# Patient Record
Sex: FEMALE | Race: BLACK OR AFRICAN AMERICAN | NOT HISPANIC OR LATINO | URBAN - METROPOLITAN AREA
[De-identification: names, ages, dates, MRNs, and addresses within clinical notes are randomized per-mention and may not be internally consistent; named-entity substitution may affect disease eponyms.]

---

## 2021-11-03 ENCOUNTER — OUTPATIENT (OUTPATIENT)
Dept: OUTPATIENT SERVICES | Facility: HOSPITAL | Age: 41
LOS: 1 days | End: 2021-11-03
Payer: MEDICAID

## 2021-11-03 ENCOUNTER — TRANSCRIPTION ENCOUNTER (OUTPATIENT)
Age: 41
End: 2021-11-03

## 2021-11-03 VITALS
TEMPERATURE: 98 F | SYSTOLIC BLOOD PRESSURE: 132 MMHG | WEIGHT: 190.04 LBS | DIASTOLIC BLOOD PRESSURE: 83 MMHG | HEART RATE: 81 BPM | OXYGEN SATURATION: 99 % | HEIGHT: 66 IN | RESPIRATION RATE: 16 BRPM

## 2021-11-03 DIAGNOSIS — N93.9 ABNORMAL UTERINE AND VAGINAL BLEEDING, UNSPECIFIED: ICD-10-CM

## 2021-11-03 DIAGNOSIS — M32.9 SYSTEMIC LUPUS ERYTHEMATOSUS, UNSPECIFIED: ICD-10-CM

## 2021-11-03 DIAGNOSIS — Z29.9 ENCOUNTER FOR PROPHYLACTIC MEASURES, UNSPECIFIED: ICD-10-CM

## 2021-11-03 DIAGNOSIS — Z01.818 ENCOUNTER FOR OTHER PREPROCEDURAL EXAMINATION: ICD-10-CM

## 2021-11-03 DIAGNOSIS — G47.33 OBSTRUCTIVE SLEEP APNEA (ADULT) (PEDIATRIC): ICD-10-CM

## 2021-11-03 DIAGNOSIS — Z98.51 TUBAL LIGATION STATUS: Chronic | ICD-10-CM

## 2021-11-03 LAB — BLD GP AB SCN SERPL QL: SIGNIFICANT CHANGE UP

## 2021-11-03 PROCEDURE — G0463: CPT

## 2021-11-03 PROCEDURE — 93005 ELECTROCARDIOGRAM TRACING: CPT

## 2021-11-03 PROCEDURE — 93010 ELECTROCARDIOGRAM REPORT: CPT

## 2021-11-03 NOTE — H&P PST ADULT - PROBLEM SELECTOR PLAN 4
On no medications  Maintain follow up appointment On no medications  Maintain follow up appointment with rheumatologist

## 2021-11-03 NOTE — H&P PST ADULT - NSICDXPASTMEDICALHX_GEN_ALL_CORE_FT
PAST MEDICAL HISTORY:  Fibroid uterus     Lupus , last flare up 6/21    Sciatica     Umbilical hernia

## 2021-11-03 NOTE — H&P PST ADULT - ATTENDING COMMENTS
42 yo with AUB, pelvic pain and fibroids for BEE, Bilateral salpingectomy, USS, cystoscopy  r/b/a extensively discussed, risks of pain, bleeding, infection, damage to adjacent organs, need for repair, additional surgeries, prolapse    pt voiced understanding and agreed with the plan    [email or web address]

## 2021-11-03 NOTE — H&P PST ADULT - PROBLEM SELECTOR PLAN 2
Instruction regarding chlorhexidine soap use x 2 days before surgery is given.  Patient verbalized understanding.   Literature also given

## 2021-11-03 NOTE — H&P PST ADULT - NSANTHOSAYNRD_GEN_A_CORE
No. HARJIT screening performed.  STOP BANG Legend: 0-2 = LOW Risk; 3-4 = INTERMEDIATE Risk; 5-8 = HIGH Risk

## 2021-11-03 NOTE — H&P PST ADULT - HISTORY OF PRESENT ILLNESS
40 yo female with history of Lupus, reports the above.   She is scheduled for : Cystoscopy  Open Hysterectomy, on 11/11/21

## 2021-11-04 LAB
A1C WITH ESTIMATED AVERAGE GLUCOSE RESULT: 5.7 % — HIGH (ref 4–5.6)
ESTIMATED AVERAGE GLUCOSE: 117 MG/DL — HIGH (ref 68–114)

## 2021-11-06 DIAGNOSIS — Z01.818 ENCOUNTER FOR OTHER PREPROCEDURAL EXAMINATION: ICD-10-CM

## 2021-11-06 PROBLEM — Z00.00 ENCOUNTER FOR PREVENTIVE HEALTH EXAMINATION: Status: ACTIVE | Noted: 2021-11-06

## 2021-11-08 ENCOUNTER — APPOINTMENT (OUTPATIENT)
Dept: DISASTER EMERGENCY | Facility: CLINIC | Age: 41
End: 2021-11-08

## 2021-11-09 LAB — SARS-COV-2 N GENE NPH QL NAA+PROBE: NOT DETECTED

## 2021-11-10 ENCOUNTER — TRANSCRIPTION ENCOUNTER (OUTPATIENT)
Age: 41
End: 2021-11-10

## 2021-11-11 ENCOUNTER — RESULT REVIEW (OUTPATIENT)
Age: 41
End: 2021-11-11

## 2021-11-11 ENCOUNTER — INPATIENT (INPATIENT)
Facility: HOSPITAL | Age: 41
LOS: 2 days | Discharge: HOME | DRG: 743 | End: 2021-11-14
Attending: OBSTETRICS & GYNECOLOGY | Admitting: OBSTETRICS & GYNECOLOGY
Payer: MEDICAID

## 2021-11-11 VITALS
HEIGHT: 66 IN | WEIGHT: 190.04 LBS | DIASTOLIC BLOOD PRESSURE: 70 MMHG | SYSTOLIC BLOOD PRESSURE: 122 MMHG | RESPIRATION RATE: 18 BRPM | HEART RATE: 89 BPM | TEMPERATURE: 100 F | OXYGEN SATURATION: 100 %

## 2021-11-11 DIAGNOSIS — N93.9 ABNORMAL UTERINE AND VAGINAL BLEEDING, UNSPECIFIED: ICD-10-CM

## 2021-11-11 DIAGNOSIS — Z98.51 TUBAL LIGATION STATUS: Chronic | ICD-10-CM

## 2021-11-11 LAB
BLD GP AB SCN SERPL QL: SIGNIFICANT CHANGE UP
GLUCOSE BLDC GLUCOMTR-MCNC: 103 MG/DL — HIGH (ref 70–99)
GLUCOSE BLDC GLUCOMTR-MCNC: 110 MG/DL — HIGH (ref 70–99)
HCG UR QL: NEGATIVE — SIGNIFICANT CHANGE UP

## 2021-11-11 PROCEDURE — 88307 TISSUE EXAM BY PATHOLOGIST: CPT | Mod: 26

## 2021-11-11 RX ORDER — ENOXAPARIN SODIUM 100 MG/ML
40 INJECTION SUBCUTANEOUS EVERY 24 HOURS
Refills: 0 | Status: DISCONTINUED | OUTPATIENT
Start: 2021-11-12 | End: 2021-11-14

## 2021-11-11 RX ORDER — NALOXONE HYDROCHLORIDE 4 MG/.1ML
0.1 SPRAY NASAL
Refills: 0 | Status: DISCONTINUED | OUTPATIENT
Start: 2021-11-11 | End: 2021-11-14

## 2021-11-11 RX ORDER — SIMETHICONE 80 MG/1
80 TABLET, CHEWABLE ORAL EVERY 6 HOURS
Refills: 0 | Status: DISCONTINUED | OUTPATIENT
Start: 2021-11-11 | End: 2021-11-14

## 2021-11-11 RX ORDER — SODIUM CHLORIDE 9 MG/ML
1000 INJECTION, SOLUTION INTRAVENOUS
Refills: 0 | Status: DISCONTINUED | OUTPATIENT
Start: 2021-11-11 | End: 2021-11-11

## 2021-11-11 RX ORDER — ACETAMINOPHEN 500 MG
1000 TABLET ORAL EVERY 6 HOURS
Refills: 0 | Status: DISCONTINUED | OUTPATIENT
Start: 2021-11-11 | End: 2021-11-14

## 2021-11-11 RX ORDER — IBUPROFEN 200 MG
600 TABLET ORAL EVERY 6 HOURS
Refills: 0 | Status: DISCONTINUED | OUTPATIENT
Start: 2021-11-11 | End: 2021-11-11

## 2021-11-11 RX ORDER — HYDROMORPHONE HYDROCHLORIDE 2 MG/ML
30 INJECTION INTRAMUSCULAR; INTRAVENOUS; SUBCUTANEOUS
Refills: 0 | Status: DISCONTINUED | OUTPATIENT
Start: 2021-11-11 | End: 2021-11-11

## 2021-11-11 RX ORDER — HYDROMORPHONE HYDROCHLORIDE 2 MG/ML
0.5 INJECTION INTRAMUSCULAR; INTRAVENOUS; SUBCUTANEOUS
Refills: 0 | Status: DISCONTINUED | OUTPATIENT
Start: 2021-11-11 | End: 2021-11-11

## 2021-11-11 RX ORDER — ONDANSETRON 8 MG/1
8 TABLET, FILM COATED ORAL EVERY 8 HOURS
Refills: 0 | Status: DISCONTINUED | OUTPATIENT
Start: 2021-11-11 | End: 2021-11-14

## 2021-11-11 RX ORDER — OXYCODONE HYDROCHLORIDE 5 MG/1
10 TABLET ORAL EVERY 4 HOURS
Refills: 0 | Status: DISCONTINUED | OUTPATIENT
Start: 2021-11-11 | End: 2021-11-11

## 2021-11-11 RX ORDER — PANTOPRAZOLE SODIUM 20 MG/1
20 TABLET, DELAYED RELEASE ORAL DAILY
Refills: 0 | Status: DISCONTINUED | OUTPATIENT
Start: 2021-11-11 | End: 2021-11-12

## 2021-11-11 RX ORDER — CHLORHEXIDINE GLUCONATE 213 G/1000ML
1 SOLUTION TOPICAL ONCE
Refills: 0 | Status: DISCONTINUED | OUTPATIENT
Start: 2021-11-11 | End: 2021-11-11

## 2021-11-11 RX ORDER — ACETAMINOPHEN 500 MG
1000 TABLET ORAL EVERY 6 HOURS
Refills: 0 | Status: DISCONTINUED | OUTPATIENT
Start: 2021-11-11 | End: 2021-11-11

## 2021-11-11 RX ORDER — SODIUM CHLORIDE 9 MG/ML
3 INJECTION INTRAMUSCULAR; INTRAVENOUS; SUBCUTANEOUS EVERY 8 HOURS
Refills: 0 | Status: DISCONTINUED | OUTPATIENT
Start: 2021-11-11 | End: 2021-11-11

## 2021-11-11 RX ORDER — HYDROMORPHONE HYDROCHLORIDE 2 MG/ML
4 INJECTION INTRAMUSCULAR; INTRAVENOUS; SUBCUTANEOUS ONCE
Refills: 0 | Status: DISCONTINUED | OUTPATIENT
Start: 2021-11-11 | End: 2021-11-11

## 2021-11-11 RX ORDER — MORPHINE SULFATE 50 MG/1
4 CAPSULE, EXTENDED RELEASE ORAL EVERY 4 HOURS
Refills: 0 | Status: DISCONTINUED | OUTPATIENT
Start: 2021-11-11 | End: 2021-11-12

## 2021-11-11 RX ORDER — KETOROLAC TROMETHAMINE 30 MG/ML
15 SYRINGE (ML) INJECTION EVERY 8 HOURS
Refills: 0 | Status: DISCONTINUED | OUTPATIENT
Start: 2021-11-11 | End: 2021-11-11

## 2021-11-11 RX ORDER — ACETAMINOPHEN 500 MG
1000 TABLET ORAL ONCE
Refills: 0 | Status: DISCONTINUED | OUTPATIENT
Start: 2021-11-11 | End: 2021-11-11

## 2021-11-11 RX ORDER — ONDANSETRON 8 MG/1
4 TABLET, FILM COATED ORAL ONCE
Refills: 0 | Status: DISCONTINUED | OUTPATIENT
Start: 2021-11-11 | End: 2021-11-11

## 2021-11-11 RX ORDER — KETOROLAC TROMETHAMINE 30 MG/ML
15 SYRINGE (ML) INJECTION EVERY 8 HOURS
Refills: 0 | Status: DISCONTINUED | OUTPATIENT
Start: 2021-11-11 | End: 2021-11-12

## 2021-11-11 RX ORDER — SENNA PLUS 8.6 MG/1
1 TABLET ORAL AT BEDTIME
Refills: 0 | Status: DISCONTINUED | OUTPATIENT
Start: 2021-11-11 | End: 2021-11-14

## 2021-11-11 RX ORDER — ONDANSETRON 8 MG/1
4 TABLET, FILM COATED ORAL EVERY 6 HOURS
Refills: 0 | Status: DISCONTINUED | OUTPATIENT
Start: 2021-11-11 | End: 2021-11-14

## 2021-11-11 RX ORDER — SODIUM CHLORIDE 9 MG/ML
1000 INJECTION, SOLUTION INTRAVENOUS
Refills: 0 | Status: DISCONTINUED | OUTPATIENT
Start: 2021-11-11 | End: 2021-11-12

## 2021-11-11 RX ORDER — INFLUENZA VIRUS VACCINE 15; 15; 15; 15 UG/.5ML; UG/.5ML; UG/.5ML; UG/.5ML
0.5 SUSPENSION INTRAMUSCULAR ONCE
Refills: 0 | Status: COMPLETED | OUTPATIENT
Start: 2021-11-11 | End: 2021-11-11

## 2021-11-11 RX ADMIN — ONDANSETRON 4 MILLIGRAM(S): 8 TABLET, FILM COATED ORAL at 20:26

## 2021-11-11 RX ADMIN — HYDROMORPHONE HYDROCHLORIDE 4 MILLIGRAM(S): 2 INJECTION INTRAMUSCULAR; INTRAVENOUS; SUBCUTANEOUS at 22:19

## 2021-11-11 RX ADMIN — Medication 15 MILLIGRAM(S): at 21:01

## 2021-11-11 RX ADMIN — SODIUM CHLORIDE 125 MILLILITER(S): 9 INJECTION, SOLUTION INTRAVENOUS at 23:53

## 2021-11-11 RX ADMIN — HYDROMORPHONE HYDROCHLORIDE 4 MILLIGRAM(S): 2 INJECTION INTRAMUSCULAR; INTRAVENOUS; SUBCUTANEOUS at 23:19

## 2021-11-11 RX ADMIN — Medication 15 MILLIGRAM(S): at 22:00

## 2021-11-11 NOTE — PROGRESS NOTE ADULT - SUBJECTIVE AND OBJECTIVE BOX
Pt seen at bedside s/p BEE, cystoscopy offers no complaints. Denies n/v, cp; sob; palpitations; or dizziness    T(C): 36.9 (11-11-21 @ 15:52), Max: 37.7 (11-11-21 @ 08:23)  HR: 99 (11-11-21 @ 15:52) (87 - 99)  BP: 143/55 (11-11-21 @ 15:52) (116/73 - 143/55)  RR: 17 (11-11-21 @ 15:52) (17 - 22)  SpO2: 98% (11-11-21 @ 15:52) (96% - 100%)  urine clr    abd: soft/nt, dressing in place C/D/I  pelvic: no vaginal bleeding  ext: venodynes in place; no calf pain    a/p POD #0 s/p BEE, cystoscopy  cont close monitor   cont post op care  d/w dr Rob Pt seen at bedside s/p BEE, cystoscopy c/o incision pain. Denies n/v, cp; sob; palpitations; or dizziness    T(C): 36.9 (11-11-21 @ 15:52), Max: 37.7 (11-11-21 @ 08:23)  HR: 99 (11-11-21 @ 15:52) (87 - 99)  BP: 143/55 (11-11-21 @ 15:52) (116/73 - 143/55)  RR: 17 (11-11-21 @ 15:52) (17 - 22)  SpO2: 98% (11-11-21 @ 15:52) (96% - 100%)  urine clr    abd: soft/nt, dressing in place C/D/I  pelvic: no vaginal bleeding  ext: non-edematous no calf pain

## 2021-11-11 NOTE — PROGRESS NOTE ADULT - PROBLEM SELECTOR PLAN 1
a/p POD #0 s/p BEE, cystoscopy  pca in place for pain mgmt  cont close monitor   cont post op care  fu cbc/bmp in am  d/w dr Rob a/p POD #0 s/p BEE, cystoscopy  pain mgmt upon patient  cont close monitor  cont post op care  fu cbc/bmp in am  d/w dr Rob

## 2021-11-11 NOTE — BRIEF OPERATIVE NOTE - NSICDXBRIEFPROCEDURE_GEN_ALL_CORE_FT
PROCEDURES:  Open total abdominal hysterectomy 11-Nov-2021 21:29:31  Lexis Rob  Uterosacral colpopexy 11-Nov-2021 21:31:03  Lexis Rob  Cystoscopy 11-Nov-2021 21:31:11  Lexis Rob

## 2021-11-11 NOTE — BRIEF OPERATIVE NOTE - NSICDXBRIEFPOSTOP_GEN_ALL_CORE_FT
POST-OP DIAGNOSIS:  Fibroids, intramural 11-Nov-2021 21:34:37  Lexis Rob  Pain pelvic 11-Nov-2021 21:34:46  Lexis Rob

## 2021-11-11 NOTE — BRIEF OPERATIVE NOTE - OPERATION/FINDINGS
26 wks uterus, normal right ovary, no tubes bilaterally. Left ovary "parasitic"" no IP identified, ovary embedded in the uterine wall, detached from the lateral wall. Adhesions between uterus and posterior cul-de-sac

## 2021-11-11 NOTE — BRIEF OPERATIVE NOTE - NSICDXBRIEFPREOP_GEN_ALL_CORE_FT
PRE-OP DIAGNOSIS:  Fibroids, intramural 11-Nov-2021 21:34:04  Lexis Rob  Pain pelvic 11-Nov-2021 21:34:13  Lexis Rob

## 2021-11-12 DIAGNOSIS — Z90.710 ACQUIRED ABSENCE OF BOTH CERVIX AND UTERUS: ICD-10-CM

## 2021-11-12 DIAGNOSIS — Z90.79 ACQUIRED ABSENCE OF OTHER GENITAL ORGAN(S): ICD-10-CM

## 2021-11-12 LAB
ANION GAP SERPL CALC-SCNC: 3 MMOL/L — LOW (ref 5–17)
BUN SERPL-MCNC: 6 MG/DL — LOW (ref 7–18)
CALCIUM SERPL-MCNC: 8.1 MG/DL — LOW (ref 8.4–10.5)
CHLORIDE SERPL-SCNC: 109 MMOL/L — HIGH (ref 96–108)
CO2 SERPL-SCNC: 27 MMOL/L — SIGNIFICANT CHANGE UP (ref 22–31)
CREAT SERPL-MCNC: 0.41 MG/DL — LOW (ref 0.5–1.3)
GLUCOSE SERPL-MCNC: 86 MG/DL — SIGNIFICANT CHANGE UP (ref 70–99)
HCT VFR BLD CALC: 28 % — LOW (ref 34.5–45)
HGB BLD-MCNC: 8.2 G/DL — LOW (ref 11.5–15.5)
MCHC RBC-ENTMCNC: 22.2 PG — LOW (ref 27–34)
MCHC RBC-ENTMCNC: 29.3 GM/DL — LOW (ref 32–36)
MCV RBC AUTO: 75.7 FL — LOW (ref 80–100)
NRBC # BLD: 0 /100 WBCS — SIGNIFICANT CHANGE UP (ref 0–0)
PLATELET # BLD AUTO: 289 K/UL — SIGNIFICANT CHANGE UP (ref 150–400)
POTASSIUM SERPL-MCNC: 3.6 MMOL/L — SIGNIFICANT CHANGE UP (ref 3.5–5.3)
POTASSIUM SERPL-SCNC: 3.6 MMOL/L — SIGNIFICANT CHANGE UP (ref 3.5–5.3)
RBC # BLD: 3.7 M/UL — LOW (ref 3.8–5.2)
RBC # FLD: 18.1 % — HIGH (ref 10.3–14.5)
SODIUM SERPL-SCNC: 139 MMOL/L — SIGNIFICANT CHANGE UP (ref 135–145)
WBC # BLD: 9.77 K/UL — SIGNIFICANT CHANGE UP (ref 3.8–10.5)
WBC # FLD AUTO: 9.77 K/UL — SIGNIFICANT CHANGE UP (ref 3.8–10.5)

## 2021-11-12 RX ORDER — PANTOPRAZOLE SODIUM 20 MG/1
40 TABLET, DELAYED RELEASE ORAL DAILY
Refills: 0 | Status: DISCONTINUED | OUTPATIENT
Start: 2021-11-12 | End: 2021-11-14

## 2021-11-12 RX ORDER — IRON SUCROSE 20 MG/ML
200 INJECTION, SOLUTION INTRAVENOUS EVERY 24 HOURS
Refills: 0 | Status: DISCONTINUED | OUTPATIENT
Start: 2021-11-12 | End: 2021-11-12

## 2021-11-12 RX ORDER — OXYCODONE HYDROCHLORIDE 5 MG/1
5 TABLET ORAL EVERY 4 HOURS
Refills: 0 | Status: DISCONTINUED | OUTPATIENT
Start: 2021-11-12 | End: 2021-11-12

## 2021-11-12 RX ORDER — IBUPROFEN 200 MG
600 TABLET ORAL EVERY 6 HOURS
Refills: 0 | Status: DISCONTINUED | OUTPATIENT
Start: 2021-11-12 | End: 2021-11-14

## 2021-11-12 RX ORDER — DIPHENHYDRAMINE HCL 50 MG
25 CAPSULE ORAL ONCE
Refills: 0 | Status: COMPLETED | OUTPATIENT
Start: 2021-11-12 | End: 2021-11-12

## 2021-11-12 RX ORDER — IRON SUCROSE 20 MG/ML
200 INJECTION, SOLUTION INTRAVENOUS EVERY 24 HOURS
Refills: 0 | Status: COMPLETED | OUTPATIENT
Start: 2021-11-12 | End: 2021-11-13

## 2021-11-12 RX ORDER — HYDROMORPHONE HYDROCHLORIDE 2 MG/ML
1 INJECTION INTRAMUSCULAR; INTRAVENOUS; SUBCUTANEOUS ONCE
Refills: 0 | Status: DISCONTINUED | OUTPATIENT
Start: 2021-11-12 | End: 2021-11-12

## 2021-11-12 RX ADMIN — IRON SUCROSE 110 MILLIGRAM(S): 20 INJECTION, SOLUTION INTRAVENOUS at 13:11

## 2021-11-12 RX ADMIN — MORPHINE SULFATE 4 MILLIGRAM(S): 50 CAPSULE, EXTENDED RELEASE ORAL at 05:44

## 2021-11-12 RX ADMIN — ENOXAPARIN SODIUM 40 MILLIGRAM(S): 100 INJECTION SUBCUTANEOUS at 05:44

## 2021-11-12 RX ADMIN — Medication 25 MILLIGRAM(S): at 10:43

## 2021-11-12 RX ADMIN — SIMETHICONE 80 MILLIGRAM(S): 80 TABLET, CHEWABLE ORAL at 20:09

## 2021-11-12 RX ADMIN — Medication 15 MILLIGRAM(S): at 13:30

## 2021-11-12 RX ADMIN — Medication 1 TABLET(S): at 13:11

## 2021-11-12 RX ADMIN — Medication 15 MILLIGRAM(S): at 13:13

## 2021-11-12 RX ADMIN — HYDROMORPHONE HYDROCHLORIDE 1 MILLIGRAM(S): 2 INJECTION INTRAMUSCULAR; INTRAVENOUS; SUBCUTANEOUS at 10:43

## 2021-11-12 RX ADMIN — HYDROMORPHONE HYDROCHLORIDE 1 MILLIGRAM(S): 2 INJECTION INTRAMUSCULAR; INTRAVENOUS; SUBCUTANEOUS at 11:00

## 2021-11-12 RX ADMIN — SENNA PLUS 1 TABLET(S): 8.6 TABLET ORAL at 17:25

## 2021-11-12 RX ADMIN — SODIUM CHLORIDE 125 MILLILITER(S): 9 INJECTION, SOLUTION INTRAVENOUS at 05:46

## 2021-11-12 NOTE — PROGRESS NOTE ADULT - ASSESSMENT
A/P:  38 yo s/p BEE/BS with cystoscopy. currently stable  - garsia removed, f/u trial void  - Pain management  -  reg diet,  - OOB and ambulate  - d/w DR Parker, for possible d/c home today A/P:  42 yo s/p BEE/BS with cystoscopy. currently stable  - garsia removed, f/u trial void  - Pain management  -  reg diet,  - OOB and ambulate  - d/w DR Parker,

## 2021-11-12 NOTE — PROGRESS NOTE ADULT - SUBJECTIVE AND OBJECTIVE BOX
Pt seen at 7:30 am  41 year-old POD1 s/p BEE, left salpingectomy, USS, cystoscopy   s: patient is doing well, has no complaints, pain is well controlled with meds, no nausea or vomiting. Tolerates clears.   Darby in place    VS: 127/68, PS 90, afebrile  Lungs CTA bilaterally  Abd: soft, mildly tender at the sites of incision, no rebound or guarding, dressing c/d/i  bowel sounds sluggish  Pad: clear  Darby in place, drains clear urine, UO >100 cc/hour  LE: in SCD boots, neg Era's bilaterally      CBC: Hb 10.9 --> 8.2/28  BMP: 139/3.6, Cr 0.41    A: smooth post-op progress, asymptomatic anemia  Plan: remove Darby  Pain management prn  out of bed  Venofer IV  advance diet as tolerated    pmedvedeva

## 2021-11-12 NOTE — PROGRESS NOTE ADULT - SUBJECTIVE AND OBJECTIVE BOX
YOBANI SCHMIDT Note, POD #1  Patient seen resting comfortably.  No current complaints.  Denies HA, CP, SOB, dizziness, palpitations, worsening abdominal pain, worsening vaginal bleeding or any other concerns.  + Ambulation,  garsia in place, voiding clear urine.  + flatus and tolerating regular diet.     Vital Signs Last 24 Hrs  T(C): 37.5 (12 Nov 2021 12:33), Max: 37.5 (12 Nov 2021 12:33)  T(F): 99.5 (12 Nov 2021 12:33), Max: 99.5 (12 Nov 2021 12:33)  HR: 100 (12 Nov 2021 12:33) (87 - 100)  BP: 142/75 (12 Nov 2021 12:33) (116/73 - 144/78)  BP(mean): 84 (11 Nov 2021 15:14) (77 - 95)  RR: 16 (12 Nov 2021 12:33) (16 - 22)  SpO2: 96% (12 Nov 2021 12:33) (96% - 100%)    Gen: A&O x3, NAD  Chest: CTABL  Cardiac: S1+S2+ RRR  Abdomen: Soft, nontender, +BS, nondistended, incisions clean/dry/intact  Gyn: No active bleeding  Extremities: Nontender, no worsening edema, DTRS 2+, venodynes intact bilaterally                8.2    9.77  )-----------( 289      ( 12 Nov 2021 06:46 )             28.0     139  |  109<H>  |  6<L>  ----------------------------<  86  3.6   |  27  |  0.41<L>    Ca    8.1<L>      12 Nov 2021 06:46                           YOBANI SCHMIDT Note, POD #1  Patient seen resting comfortably.  No current complaints.  Denies HA, CP, SOB, dizziness, palpitations, worsening abdominal pain, worsening vaginal bleeding or any other concerns.  + Ambulation,  garsia in place, voiding clear urine.  + flatus and tolerating regular diet.     Vital Signs Last 24 Hrs  T(C): 37.5 (12 Nov 2021 12:33), Max: 37.5 (12 Nov 2021 12:33)  T(F): 99.5 (12 Nov 2021 12:33), Max: 99.5 (12 Nov 2021 12:33)  HR: 100 (12 Nov 2021 12:33) (87 - 100)  BP: 142/75 (12 Nov 2021 12:33) (116/73 - 144/78)  BP(mean): 84 (11 Nov 2021 15:14) (77 - 95)  RR: 16 (12 Nov 2021 12:33) (16 - 22)  SpO2: 96% (12 Nov 2021 12:33) (96% - 100%)    Gen: A&O x3, NAD  Chest: CTABL  Cardiac: S1+S2+ RRR  Abdomen: Soft, nontender, +BS, nondistended,  vertical incisions clean/dry/intact  Gyn: No active bleeding  Extremities: Nontender, no worsening edema, DTRS 2+, venodynes intact bilaterally                8.2    9.77  )-----------( 289      ( 12 Nov 2021 06:46 )             28.0     139  |  109<H>  |  6<L>  ----------------------------<  86  3.6   |  27  |  0.41<L>    Ca    8.1<L>      12 Nov 2021 06:46

## 2021-11-13 LAB
BASOPHILS # BLD AUTO: 0.03 K/UL — SIGNIFICANT CHANGE UP (ref 0–0.2)
BASOPHILS NFR BLD AUTO: 0.3 % — SIGNIFICANT CHANGE UP (ref 0–2)
EOSINOPHIL # BLD AUTO: 0.01 K/UL — SIGNIFICANT CHANGE UP (ref 0–0.5)
EOSINOPHIL NFR BLD AUTO: 0.1 % — SIGNIFICANT CHANGE UP (ref 0–6)
HCT VFR BLD CALC: 28.7 % — LOW (ref 34.5–45)
HGB BLD-MCNC: 8.3 G/DL — LOW (ref 11.5–15.5)
IMM GRANULOCYTES NFR BLD AUTO: 0.3 % — SIGNIFICANT CHANGE UP (ref 0–1.5)
LYMPHOCYTES # BLD AUTO: 1.87 K/UL — SIGNIFICANT CHANGE UP (ref 1–3.3)
LYMPHOCYTES # BLD AUTO: 21.2 % — SIGNIFICANT CHANGE UP (ref 13–44)
MCHC RBC-ENTMCNC: 21.9 PG — LOW (ref 27–34)
MCHC RBC-ENTMCNC: 28.9 GM/DL — LOW (ref 32–36)
MCV RBC AUTO: 75.7 FL — LOW (ref 80–100)
MONOCYTES # BLD AUTO: 0.85 K/UL — SIGNIFICANT CHANGE UP (ref 0–0.9)
MONOCYTES NFR BLD AUTO: 9.6 % — SIGNIFICANT CHANGE UP (ref 2–14)
NEUTROPHILS # BLD AUTO: 6.02 K/UL — SIGNIFICANT CHANGE UP (ref 1.8–7.4)
NEUTROPHILS NFR BLD AUTO: 68.5 % — SIGNIFICANT CHANGE UP (ref 43–77)
NRBC # BLD: 0 /100 WBCS — SIGNIFICANT CHANGE UP (ref 0–0)
PLATELET # BLD AUTO: 327 K/UL — SIGNIFICANT CHANGE UP (ref 150–400)
RBC # BLD: 3.79 M/UL — LOW (ref 3.8–5.2)
RBC # FLD: 17.8 % — HIGH (ref 10.3–14.5)
WBC # BLD: 8.81 K/UL — SIGNIFICANT CHANGE UP (ref 3.8–10.5)
WBC # FLD AUTO: 8.81 K/UL — SIGNIFICANT CHANGE UP (ref 3.8–10.5)

## 2021-11-13 RX ORDER — FERROUS SULFATE 325(65) MG
325 TABLET ORAL
Refills: 0 | Status: DISCONTINUED | OUTPATIENT
Start: 2021-11-13 | End: 2021-11-13

## 2021-11-13 RX ADMIN — Medication 10 MILLIGRAM(S): at 12:58

## 2021-11-13 RX ADMIN — ENOXAPARIN SODIUM 40 MILLIGRAM(S): 100 INJECTION SUBCUTANEOUS at 05:55

## 2021-11-13 RX ADMIN — Medication 1 TABLET(S): at 11:01

## 2021-11-13 RX ADMIN — PANTOPRAZOLE SODIUM 40 MILLIGRAM(S): 20 TABLET, DELAYED RELEASE ORAL at 11:01

## 2021-11-13 RX ADMIN — Medication 325 MILLIGRAM(S): at 17:52

## 2021-11-13 RX ADMIN — SIMETHICONE 80 MILLIGRAM(S): 80 TABLET, CHEWABLE ORAL at 11:01

## 2021-11-13 NOTE — PROGRESS NOTE ADULT - SUBJECTIVE AND OBJECTIVE BOX
41 year-old POD2 s/p BEE, left salpingectomy, USS, cystoscopy   s: patient is doing well, has no complaints, pain is well controlled with meds, no nausea or vomiting. Tolerates regular diet  Voids free, - flatus - stool  VTE prophylaxis: Lovenox  Anemia (asymptomatic) on Venofer    VS: 128/70, PS 94, T 99.9  Lungs CTA bilaterally  Abd: soft, mildly tender at the site of incision, no rebound or guarding, incision c/d/i  bowel sounds + 4Q  Pad: clear  LE: neg Era's bilaterally      CBC: Hb 10.9 --> 8.2/28 --> 8.3  BMP: 139/3.6, Cr 0.41    A: smooth post-op progress, asymptomatic anemia  Plan: continue present management  Pain management prn  out of bed  Venofer IV: last dose today  Lovenox  Bisacodyl supp    pmedvedeva    [phone]   41 year-old POD2 s/p BEE, left oophrectomy, USS, cystoscopy   s: patient is doing well, has no complaints, pain is well controlled with meds, no nausea or vomiting. Tolerates regular diet  Voids free, - flatus - stool  VTE prophylaxis: Lovenox  Anemia (asymptomatic) on Venofer    VS: 128/70, PS 94, T 99.9  Lungs CTA bilaterally  Abd: soft, mildly tender at the site of incision, no rebound or guarding, incision c/d/i  bowel sounds + 4Q  Pad: clear  LE: neg Era's bilaterally      CBC: Hb 10.9 --> 8.2/28 --> 8.3  BMP: 139/3.6, Cr 0.41    A: smooth post-op progress, asymptomatic anemia  Plan: continue present management  Pain management prn  out of bed  Venofer IV: last dose today  Lovenox  Bisacodyl supp    pmedvedeva    [phone]

## 2021-11-14 ENCOUNTER — TRANSCRIPTION ENCOUNTER (OUTPATIENT)
Age: 41
End: 2021-11-14

## 2021-11-14 VITALS
HEART RATE: 99 BPM | SYSTOLIC BLOOD PRESSURE: 121 MMHG | DIASTOLIC BLOOD PRESSURE: 77 MMHG | RESPIRATION RATE: 18 BRPM | OXYGEN SATURATION: 95 % | TEMPERATURE: 99 F

## 2021-11-14 LAB
BASOPHILS # BLD AUTO: 0.02 K/UL — SIGNIFICANT CHANGE UP (ref 0–0.2)
BASOPHILS NFR BLD AUTO: 0.3 % — SIGNIFICANT CHANGE UP (ref 0–2)
EOSINOPHIL # BLD AUTO: 0.09 K/UL — SIGNIFICANT CHANGE UP (ref 0–0.5)
EOSINOPHIL NFR BLD AUTO: 1.2 % — SIGNIFICANT CHANGE UP (ref 0–6)
HCT VFR BLD CALC: 28.5 % — LOW (ref 34.5–45)
HGB BLD-MCNC: 8.5 G/DL — LOW (ref 11.5–15.5)
IMM GRANULOCYTES NFR BLD AUTO: 0.4 % — SIGNIFICANT CHANGE UP (ref 0–1.5)
LYMPHOCYTES # BLD AUTO: 1.76 K/UL — SIGNIFICANT CHANGE UP (ref 1–3.3)
LYMPHOCYTES # BLD AUTO: 23.5 % — SIGNIFICANT CHANGE UP (ref 13–44)
MCHC RBC-ENTMCNC: 22 PG — LOW (ref 27–34)
MCHC RBC-ENTMCNC: 29.8 GM/DL — LOW (ref 32–36)
MCV RBC AUTO: 73.6 FL — LOW (ref 80–100)
MONOCYTES # BLD AUTO: 0.7 K/UL — SIGNIFICANT CHANGE UP (ref 0–0.9)
MONOCYTES NFR BLD AUTO: 9.3 % — SIGNIFICANT CHANGE UP (ref 2–14)
NEUTROPHILS # BLD AUTO: 4.89 K/UL — SIGNIFICANT CHANGE UP (ref 1.8–7.4)
NEUTROPHILS NFR BLD AUTO: 65.3 % — SIGNIFICANT CHANGE UP (ref 43–77)
NRBC # BLD: 0 /100 WBCS — SIGNIFICANT CHANGE UP (ref 0–0)
PLATELET # BLD AUTO: 330 K/UL — SIGNIFICANT CHANGE UP (ref 150–400)
RBC # BLD: 3.87 M/UL — SIGNIFICANT CHANGE UP (ref 3.8–5.2)
RBC # FLD: 17.8 % — HIGH (ref 10.3–14.5)
WBC # BLD: 7.49 K/UL — SIGNIFICANT CHANGE UP (ref 3.8–10.5)
WBC # FLD AUTO: 7.49 K/UL — SIGNIFICANT CHANGE UP (ref 3.8–10.5)

## 2021-11-14 PROCEDURE — 36415 COLL VENOUS BLD VENIPUNCTURE: CPT

## 2021-11-14 PROCEDURE — 86900 BLOOD TYPING SEROLOGIC ABO: CPT

## 2021-11-14 PROCEDURE — 80048 BASIC METABOLIC PNL TOTAL CA: CPT

## 2021-11-14 PROCEDURE — 85025 COMPLETE CBC W/AUTO DIFF WBC: CPT

## 2021-11-14 PROCEDURE — 85027 COMPLETE CBC AUTOMATED: CPT

## 2021-11-14 PROCEDURE — 86901 BLOOD TYPING SEROLOGIC RH(D): CPT

## 2021-11-14 PROCEDURE — C9399: CPT

## 2021-11-14 PROCEDURE — 86850 RBC ANTIBODY SCREEN: CPT

## 2021-11-14 PROCEDURE — 88307 TISSUE EXAM BY PATHOLOGIST: CPT

## 2021-11-14 PROCEDURE — 81025 URINE PREGNANCY TEST: CPT

## 2021-11-14 PROCEDURE — 82962 GLUCOSE BLOOD TEST: CPT

## 2021-11-14 RX ADMIN — PANTOPRAZOLE SODIUM 40 MILLIGRAM(S): 20 TABLET, DELAYED RELEASE ORAL at 11:09

## 2021-11-14 RX ADMIN — ENOXAPARIN SODIUM 40 MILLIGRAM(S): 100 INJECTION SUBCUTANEOUS at 05:58

## 2021-11-14 RX ADMIN — Medication 1 TABLET(S): at 11:09

## 2021-11-14 NOTE — DISCHARGE NOTE NURSING/CASE MANAGEMENT/SOCIAL WORK - PATIENT PORTAL LINK FT
You can access the FollowMyHealth Patient Portal offered by [facility] by registering at the following website: [email or web address]. By joining [employer]’s FollowMyHealth portal, you will also be able to view your health information using other applications (apps) compatible with our system.

## 2021-11-14 NOTE — DISCHARGE NOTE PROVIDER - NSDCCPTREATMENT_GEN_ALL_CORE_FT
PRINCIPAL PROCEDURE  Procedure: BEE (total abdominal hysterectomy)  Findings and Treatment: Ambulate daily.  No heavy lifting or anything per vagina x 8 weeks - no sex, tampons, douching, tub baths, etc.  Follow up in office as scheduled for post op check. medications sent by Dr. Rob      SECONDARY PROCEDURE  Procedure: Uterosacral colpopexy  Findings and Treatment:     Procedure: Cystoscopy  Findings and Treatment:     Procedure: Open total abdominal hysterectomy  Findings and Treatment:

## 2021-11-14 NOTE — DISCHARGE NOTE PROVIDER - NSDCCPGOAL_GEN_ALL_CORE_FT
To get better and follow your care plan as instructed.  Ambulate daily.  No heavy lifting or anything per vagina x 8 weeks - no sex, tampons, douching, tub baths, etc.  Follow up in office as scheduled for post op check. medications sent by Dr. Rob

## 2021-11-14 NOTE — DISCHARGE NOTE PROVIDER - NSDCCPCAREPLAN_GEN_ALL_CORE_FT
PRINCIPAL DISCHARGE DIAGNOSIS  Diagnosis: S/P total hysterectomy  Assessment and Plan of Treatment: Ambulate daily.  No heavy lifting or anything per vagina x 8 weeks - no sex, tampons, douching, tub baths, etc.  Follow up in office as scheduled for post op check. medications sent by Dr. Rob      SECONDARY DISCHARGE DIAGNOSES  Diagnosis: Status post bilateral salpingectomy  Assessment and Plan of Treatment: Ambulate daily.  No heavy lifting or anything per vagina x 8 weeks - no sex, tampons, douching, tub baths, etc.  Follow up in office as scheduled for post op check. medications sent by Dr. Rob    Diagnosis: Abnormal uterine and vaginal bleeding, unspecified  Assessment and Plan of Treatment: Ambulate daily.  No heavy lifting or anything per vagina x 8 weeks - no sex, tampons, douching, tub baths, etc.  Follow up in office as scheduled for post op check. medications sent by Dr. Rob

## 2021-11-14 NOTE — PROGRESS NOTE ADULT - PROBLEM SELECTOR PLAN 3
- Pain management  - reg diet  - OOB and ambulate  -lovenox DVT ppx  -discharge home today   - d/w DR Parker

## 2021-11-14 NOTE — DISCHARGE NOTE PROVIDER - CARE PROVIDER_API CALL
Lexis Rob)  Obstetrics and Gynecology  [address] [place] [address]  Phone: [phone]  Fax: [phone]  Follow Up Time:

## 2021-11-14 NOTE — PROGRESS NOTE ADULT - SUBJECTIVE AND OBJECTIVE BOX
Patient seen at bedisde resting comfortably offers no new complaints. + Ambulation, voiding without difficulty, + flatus; tolerating regular diet. Denies HA, CP, SOB, N/V/D,  no bm; dizziness, palpitations, worsening abdominal pain, worsening vaginal bleeding, or any other concerns.     Vital Signs Last 24 Hrs  T(C): 37.2 (14 Nov 2021 05:39), Max: 37.4 (13 Nov 2021 22:15)  T(F): 98.9 (14 Nov 2021 05:39), Max: 99.3 (13 Nov 2021 22:15)  HR: 90 (14 Nov 2021 05:39) (88 - 92)  BP: 137/76 (14 Nov 2021 05:39) (123/64 - 141/85)  BP(mean): --  RR: 16 (14 Nov 2021 05:39) (16 - 18)  SpO2: 99% (14 Nov 2021 05:39) (99% - 100%)    Gen: A&O x 3, NAD  Chest: CTA B/L  Cardiac: S1,S2  RRR  Breast: Soft, nontender, nonengorged  Abdomen: +BS; soft; Nontender, nondistended  Gyn: no vaginal bleeding   Extremities: Nontender, no worsening edema                          8.5    7.49  )-----------( 330      ( 14 Nov 2021 06:25 )             28.5     A/P: 41 year old POD #3 s/p BEE/BS with cystoscopy. currently stable  - Pain management  - reg diet  - OOB and ambulate  -lovenox DVT ppx  -discharge home today   - d/w DR Parker

## 2021-11-14 NOTE — DISCHARGE NOTE PROVIDER - HOSPITAL COURSE
Pt admitted for scheduled total abdominal hysterectomy s/p BEE/BS with cystoscopy.  routine post op care

## 2021-11-17 LAB — SURGICAL PATHOLOGY STUDY: SIGNIFICANT CHANGE UP

## 2022-09-02 NOTE — H&P PST ADULT - BMI (KG/M2)
Viral Respiratory Infection    A viral respiratory infection is an illness that affects parts of the body used for breathing, like the lungs, nose, and throat. It is caused by a germ called a virus. Symptoms can include runny nose, coughing, sneezing, fatigue, body aches, sore throat, fever, or headache. Over the counter medicine can be used to manage the symptoms but the infection typically goes away on its own in 5 to 10 days.   stay hydrated,   take Ibuprofen for fever\  your Liver functions are elevated mildly , we have sent hepatitis profile , will call if any thing positive   follow up with your PMD     SEEK IMMEDIATE MEDICAL CARE IF YOU HAVE ANY OF THE FOLLOWING SYMPTOMS: shortness of breath, chest pain, fever over 10 days, or lightheadedness/dizziness. 30.7

## 2024-05-07 NOTE — PATIENT PROFILE ADULT - IS PATIENT PREGNANT?
You may take Tylenol (acetaminophen)  1000 mg every 8 hours for aches, pains, and headaches. The maximum dose of Tylenol in a 24-hour period is 3000 mg.         
no